# Patient Record
Sex: MALE | Race: WHITE | NOT HISPANIC OR LATINO | Employment: FULL TIME | URBAN - METROPOLITAN AREA
[De-identification: names, ages, dates, MRNs, and addresses within clinical notes are randomized per-mention and may not be internally consistent; named-entity substitution may affect disease eponyms.]

---

## 2023-01-31 ENCOUNTER — LAB VISIT (OUTPATIENT)
Dept: LAB | Facility: HOSPITAL | Age: 53
End: 2023-01-31
Payer: COMMERCIAL

## 2023-01-31 ENCOUNTER — OFFICE VISIT (OUTPATIENT)
Dept: PRIMARY CARE CLINIC | Facility: CLINIC | Age: 53
End: 2023-01-31
Payer: COMMERCIAL

## 2023-01-31 VITALS
RESPIRATION RATE: 16 BRPM | OXYGEN SATURATION: 96 % | HEIGHT: 73 IN | TEMPERATURE: 98 F | SYSTOLIC BLOOD PRESSURE: 122 MMHG | DIASTOLIC BLOOD PRESSURE: 76 MMHG | HEART RATE: 68 BPM | WEIGHT: 213.19 LBS | BODY MASS INDEX: 28.25 KG/M2

## 2023-01-31 DIAGNOSIS — Z12.11 COLON CANCER SCREENING: ICD-10-CM

## 2023-01-31 DIAGNOSIS — Z00.00 ANNUAL PHYSICAL EXAM: ICD-10-CM

## 2023-01-31 DIAGNOSIS — Z11.4 ENCOUNTER FOR SCREENING FOR HIV: ICD-10-CM

## 2023-01-31 DIAGNOSIS — R53.83 FATIGUE, UNSPECIFIED TYPE: ICD-10-CM

## 2023-01-31 DIAGNOSIS — Z13.6 ENCOUNTER FOR LIPID SCREENING FOR CARDIOVASCULAR DISEASE: ICD-10-CM

## 2023-01-31 DIAGNOSIS — R09.81 CONGESTION OF NASAL SINUS: ICD-10-CM

## 2023-01-31 DIAGNOSIS — F41.9 ANXIETY: ICD-10-CM

## 2023-01-31 DIAGNOSIS — Z12.5 ENCOUNTER FOR SCREENING FOR MALIGNANT NEOPLASM OF PROSTATE: ICD-10-CM

## 2023-01-31 DIAGNOSIS — Z13.1 ENCOUNTER FOR SCREENING FOR DIABETES MELLITUS: ICD-10-CM

## 2023-01-31 DIAGNOSIS — Z00.00 ANNUAL PHYSICAL EXAM: Primary | ICD-10-CM

## 2023-01-31 DIAGNOSIS — Z13.220 ENCOUNTER FOR LIPID SCREENING FOR CARDIOVASCULAR DISEASE: ICD-10-CM

## 2023-01-31 DIAGNOSIS — Z76.89 ENCOUNTER TO ESTABLISH CARE: ICD-10-CM

## 2023-01-31 DIAGNOSIS — Z11.59 ENCOUNTER FOR HCV SCREENING TEST FOR LOW RISK PATIENT: ICD-10-CM

## 2023-01-31 DIAGNOSIS — Z87.891 PERSONAL HISTORY OF SMOKING: ICD-10-CM

## 2023-01-31 DIAGNOSIS — Z87.19 HISTORY OF DIVERTICULOSIS: ICD-10-CM

## 2023-01-31 DIAGNOSIS — K63.5 POLYP OF COLON, UNSPECIFIED PART OF COLON, UNSPECIFIED TYPE: ICD-10-CM

## 2023-01-31 DIAGNOSIS — J31.0 CHRONIC RHINITIS: ICD-10-CM

## 2023-01-31 LAB
ALBUMIN SERPL BCP-MCNC: 4.4 G/DL (ref 3.5–5.2)
ALP SERPL-CCNC: 80 U/L (ref 55–135)
ALT SERPL W/O P-5'-P-CCNC: 41 U/L (ref 10–44)
ANION GAP SERPL CALC-SCNC: 8 MMOL/L (ref 8–16)
AST SERPL-CCNC: 23 U/L (ref 10–40)
BASOPHILS # BLD AUTO: 0.09 K/UL (ref 0–0.2)
BASOPHILS NFR BLD: 1.5 % (ref 0–1.9)
BILIRUB SERPL-MCNC: 0.5 MG/DL (ref 0.1–1)
BUN SERPL-MCNC: 7 MG/DL (ref 6–20)
CALCIUM SERPL-MCNC: 9.8 MG/DL (ref 8.7–10.5)
CHLORIDE SERPL-SCNC: 106 MMOL/L (ref 95–110)
CHOLEST SERPL-MCNC: 274 MG/DL (ref 120–199)
CHOLEST/HDLC SERPL: 6.9 {RATIO} (ref 2–5)
CO2 SERPL-SCNC: 25 MMOL/L (ref 23–29)
COMPLEXED PSA SERPL-MCNC: 0.87 NG/ML (ref 0–4)
CREAT SERPL-MCNC: 0.9 MG/DL (ref 0.5–1.4)
DIFFERENTIAL METHOD: NORMAL
EOSINOPHIL # BLD AUTO: 0.1 K/UL (ref 0–0.5)
EOSINOPHIL NFR BLD: 1 % (ref 0–8)
ERYTHROCYTE [DISTWIDTH] IN BLOOD BY AUTOMATED COUNT: 12.5 % (ref 11.5–14.5)
EST. GFR  (NO RACE VARIABLE): >60 ML/MIN/1.73 M^2
ESTIMATED AVG GLUCOSE: 117 MG/DL (ref 68–131)
GLUCOSE SERPL-MCNC: 103 MG/DL (ref 70–110)
HBA1C MFR BLD: 5.7 % (ref 4–5.6)
HCT VFR BLD AUTO: 46.7 % (ref 40–54)
HCV AB SERPL QL IA: NORMAL
HDLC SERPL-MCNC: 40 MG/DL (ref 40–75)
HDLC SERPL: 14.6 % (ref 20–50)
HGB BLD-MCNC: 15.2 G/DL (ref 14–18)
HIV 1+2 AB+HIV1 P24 AG SERPL QL IA: NORMAL
IMM GRANULOCYTES # BLD AUTO: 0.02 K/UL (ref 0–0.04)
IMM GRANULOCYTES NFR BLD AUTO: 0.3 % (ref 0–0.5)
LDLC SERPL CALC-MCNC: 164.6 MG/DL (ref 63–159)
LYMPHOCYTES # BLD AUTO: 2.1 K/UL (ref 1–4.8)
LYMPHOCYTES NFR BLD: 33.8 % (ref 18–48)
MCH RBC QN AUTO: 28.3 PG (ref 27–31)
MCHC RBC AUTO-ENTMCNC: 32.5 G/DL (ref 32–36)
MCV RBC AUTO: 87 FL (ref 82–98)
MONOCYTES # BLD AUTO: 0.7 K/UL (ref 0.3–1)
MONOCYTES NFR BLD: 10.9 % (ref 4–15)
NEUTROPHILS # BLD AUTO: 3.2 K/UL (ref 1.8–7.7)
NEUTROPHILS NFR BLD: 52.5 % (ref 38–73)
NONHDLC SERPL-MCNC: 234 MG/DL
NRBC BLD-RTO: 0 /100 WBC
PLATELET # BLD AUTO: 333 K/UL (ref 150–450)
PMV BLD AUTO: 11 FL (ref 9.2–12.9)
POTASSIUM SERPL-SCNC: 4.7 MMOL/L (ref 3.5–5.1)
PROT SERPL-MCNC: 7.9 G/DL (ref 6–8.4)
RBC # BLD AUTO: 5.37 M/UL (ref 4.6–6.2)
SODIUM SERPL-SCNC: 139 MMOL/L (ref 136–145)
TESTOST SERPL-MCNC: 446 NG/DL (ref 304–1227)
TRIGL SERPL-MCNC: 347 MG/DL (ref 30–150)
TSH SERPL DL<=0.005 MIU/L-ACNC: 1.64 UIU/ML (ref 0.4–4)
WBC # BLD AUTO: 6.07 K/UL (ref 3.9–12.7)

## 2023-01-31 PROCEDURE — 1159F PR MEDICATION LIST DOCUMENTED IN MEDICAL RECORD: ICD-10-PCS | Mod: CPTII,S$GLB,, | Performed by: FAMILY MEDICINE

## 2023-01-31 PROCEDURE — 99999 PR PBB SHADOW E&M-NEW PATIENT-LVL V: CPT | Mod: PBBFAC,,, | Performed by: FAMILY MEDICINE

## 2023-01-31 PROCEDURE — 1160F PR REVIEW ALL MEDS BY PRESCRIBER/CLIN PHARMACIST DOCUMENTED: ICD-10-PCS | Mod: CPTII,S$GLB,, | Performed by: FAMILY MEDICINE

## 2023-01-31 PROCEDURE — 36415 COLL VENOUS BLD VENIPUNCTURE: CPT | Mod: PN | Performed by: FAMILY MEDICINE

## 2023-01-31 PROCEDURE — 83036 HEMOGLOBIN GLYCOSYLATED A1C: CPT | Performed by: FAMILY MEDICINE

## 2023-01-31 PROCEDURE — 85025 COMPLETE CBC W/AUTO DIFF WBC: CPT | Performed by: FAMILY MEDICINE

## 2023-01-31 PROCEDURE — 84403 ASSAY OF TOTAL TESTOSTERONE: CPT | Performed by: FAMILY MEDICINE

## 2023-01-31 PROCEDURE — 99386 PREV VISIT NEW AGE 40-64: CPT | Mod: S$GLB,,, | Performed by: FAMILY MEDICINE

## 2023-01-31 PROCEDURE — 3078F DIAST BP <80 MM HG: CPT | Mod: CPTII,S$GLB,, | Performed by: FAMILY MEDICINE

## 2023-01-31 PROCEDURE — 3074F SYST BP LT 130 MM HG: CPT | Mod: CPTII,S$GLB,, | Performed by: FAMILY MEDICINE

## 2023-01-31 PROCEDURE — 80061 LIPID PANEL: CPT | Performed by: FAMILY MEDICINE

## 2023-01-31 PROCEDURE — 1160F RVW MEDS BY RX/DR IN RCRD: CPT | Mod: CPTII,S$GLB,, | Performed by: FAMILY MEDICINE

## 2023-01-31 PROCEDURE — 1159F MED LIST DOCD IN RCRD: CPT | Mod: CPTII,S$GLB,, | Performed by: FAMILY MEDICINE

## 2023-01-31 PROCEDURE — 84153 ASSAY OF PSA TOTAL: CPT | Performed by: FAMILY MEDICINE

## 2023-01-31 PROCEDURE — 80053 COMPREHEN METABOLIC PANEL: CPT | Performed by: FAMILY MEDICINE

## 2023-01-31 PROCEDURE — 3074F PR MOST RECENT SYSTOLIC BLOOD PRESSURE < 130 MM HG: ICD-10-PCS | Mod: CPTII,S$GLB,, | Performed by: FAMILY MEDICINE

## 2023-01-31 PROCEDURE — 3008F BODY MASS INDEX DOCD: CPT | Mod: CPTII,S$GLB,, | Performed by: FAMILY MEDICINE

## 2023-01-31 PROCEDURE — 87389 HIV-1 AG W/HIV-1&-2 AB AG IA: CPT | Performed by: FAMILY MEDICINE

## 2023-01-31 PROCEDURE — 84443 ASSAY THYROID STIM HORMONE: CPT | Performed by: FAMILY MEDICINE

## 2023-01-31 PROCEDURE — 3078F PR MOST RECENT DIASTOLIC BLOOD PRESSURE < 80 MM HG: ICD-10-PCS | Mod: CPTII,S$GLB,, | Performed by: FAMILY MEDICINE

## 2023-01-31 PROCEDURE — 3008F PR BODY MASS INDEX (BMI) DOCUMENTED: ICD-10-PCS | Mod: CPTII,S$GLB,, | Performed by: FAMILY MEDICINE

## 2023-01-31 PROCEDURE — 99386 PR PREVENTIVE VISIT,NEW,40-64: ICD-10-PCS | Mod: S$GLB,,, | Performed by: FAMILY MEDICINE

## 2023-01-31 PROCEDURE — 99999 PR PBB SHADOW E&M-NEW PATIENT-LVL V: ICD-10-PCS | Mod: PBBFAC,,, | Performed by: FAMILY MEDICINE

## 2023-01-31 PROCEDURE — 86803 HEPATITIS C AB TEST: CPT | Performed by: FAMILY MEDICINE

## 2023-01-31 RX ORDER — SERTRALINE HYDROCHLORIDE 50 MG/1
50 TABLET, FILM COATED ORAL
COMMUNITY
Start: 2022-12-27 | End: 2023-01-31 | Stop reason: ALTCHOICE

## 2023-01-31 RX ORDER — DEXTROMETHORPHAN HBR AND PYRILAMINE MALEATE 30; 30 MG/1; MG/1
1 TABLET ORAL EVERY 6 HOURS PRN
Qty: 30 TABLET | Refills: 0 | Status: SHIPPED | OUTPATIENT
Start: 2023-01-31 | End: 2023-03-23 | Stop reason: SDUPTHER

## 2023-01-31 RX ORDER — VENLAFAXINE HYDROCHLORIDE 75 MG/1
75 CAPSULE, EXTENDED RELEASE ORAL DAILY
Qty: 90 CAPSULE | Refills: 3 | Status: SHIPPED | OUTPATIENT
Start: 2023-01-31 | End: 2024-02-12 | Stop reason: SDUPTHER

## 2023-01-31 RX ORDER — LORATADINE 10 MG/1
10 TABLET ORAL
COMMUNITY
Start: 2022-12-27 | End: 2023-01-31 | Stop reason: ALTCHOICE

## 2023-01-31 NOTE — PROGRESS NOTES
[facility]       [address] [place] [address]      Phone: [phone]      Fax: [phone]    Fletcher Mccray MD    Office Visit  01/31/2023    Establish Care      52-year-old gentleman presents today to [facility] care, checkup.      Patient states that he feels somewhat okay today.  Has been having some sinus congestion for the last couple of days.  No fever, chills, body aches.  Some occasional coughing, sneezing.  Runny nose, postnasal drip.    Otherwise, he denies any chest pain, shortness a breath.  States bowel movements are okay.  He occasionally gets constipated.  Takes a laxative, as needed.  Often feels bloated, carries his stool high.  No urinary symptoms.  No hesitation with start of urination.  Good stream.    States that he is had multiple colonoscopies over the years.  Typically gets them every 3-5 years.  Has multiple polyps every time.  Thinks his last scope was about three or four years ago.    Also has issues with anxiety.  In the past, took Lexapro.  About two years ago, switch to Zoloft.  Feels like he needs his dose increased a bit.  Biggest complaint with the Zoloft is the lack of energy, constant fatigue.  Always feels tired.    PMH: Anxiety/anger issues.  Colon polyps.  Diverticulitis.    PSH: Gallbladder.  Hand surgery.    F MH: Multiple cancers.  Lymphoma.  Breast cancer.  Cervical cancer.  Skin cancer.    Allergies:  Prednisone causes extreme hiccups.    Social:  Works as a project supervisor in construction.  Currently working on the [email or web address].    T:  Approximately 1/4 pack per day times 20+ years   A:  Occasionally  D: Denies    Exercise:  No regular exercise program      Immunizations:    There is no immunization history on file for this patient.    Care Teams:  Patient Care Team:  Fletcher Mccray MD as PCP - General (Family Medicine)    Medical History:  Past Medical History:   Diagnosis Date    Anxiety         Social History:  Social History     Socioeconomic History    Marital status:    Tobacco Use    Smoking status: Every Day     Packs/day: 0.25     Years: 25.00     Pack years: 6.25     Types: Cigarettes     Start date: 1/1/1998    Smokeless tobacco: Never   Substance and Sexual Activity    Alcohol use: Yes     Alcohol/week: 1.0 standard drink     Types: 1 Shots of liquor per week     Comment: on occasion    Drug use: Never    Sexual activity: Yes     Partners: Female     Birth control/protection: See Surgical Hx     Comment: Spouse had Hysterectomy       Alcohol History:  Social History     Substance and Sexual Activity   Alcohol Use Yes    Alcohol/week: 1.0 standard drink    Types: 1 Shots of liquor per week    Comment: on occasion       Tobacco History:  Social History     Tobacco Use   Smoking Status Every Day    Packs/day: 0.25    Years: 25.00    Pack years: 6.25    Types: Cigarettes    Start date: 1/1/1998   Smokeless Tobacco Never       Family History:  Family History   Problem Relation Age of Onset    Arthritis Mother     Cancer Mother     Hypertension Mother     Arthritis Father     Heart disease Father     Heart disease Paternal Grandmother     Cancer Maternal Uncle     Cancer Maternal Aunt     Cancer Maternal Aunt        Surgical History:  Past Surgical History:   Procedure Laterality Date    CHOLECYSTECTOMY  2014       Allergies:  Review of patient's allergies indicates:   Allergen Reactions    Prednisone Other (See Comments)     Causes severe hiccups per pt       Medications:    Current Outpatient Medications:     pyrilamine-dextromethorphan (CAPRON DMT) 30-30 mg Tab, Take 1 tablet by mouth every 6 (six) hours as needed (congestion, cough)., Disp: 30 tablet, Rfl: 0    venlafaxine (EFFEXOR-XR) 75 MG 24 hr capsule, Take 1 capsule (75 mg total) by mouth once daily., Disp: 90 capsule, Rfl: 3    Health Maintenance:  Health Maintenance   Topic Date Due    Hepatitis C Screening  Never done    Lipid  "Panel  Never done    TETANUS VACCINE  Never done       Screening Questions  1.  Do you smoke? Yes  2.  In the past month have you been bothered by feeling "down", depressed or hopeless? No  3.  In the past month, have you experienced a loss of interest or pleasure in doing things? No  4.  In the past 3 months, on any single occasion have you had 5 or more drinks containing alcohol? No  5.  Regarding your use of alcohol, have you ever felt you should cut down on your drinking? No  6.  Are you sexually active? Yes  7   Do you exercise?  intermittently    Counseling  The patient was counseled regarding diet and exercise, motor vehicle safety, sun exposure, and use of vitamins and supplements.  The patient was counseled regarding Living Will/Durable Power-Of-.  The patient was given information regarding the dangers of smoking and the overuse of alcohol.    Review of Systems   Constitutional:  Positive for fatigue. Negative for activity change, appetite change, chills and fever.   HENT:  Positive for congestion, postnasal drip and rhinorrhea. Negative for sore throat and trouble swallowing.    Respiratory:  Positive for cough. Negative for shortness of breath.    Cardiovascular:  Negative for chest pain and palpitations.   Gastrointestinal:  Positive for constipation. Negative for abdominal pain, diarrhea, nausea and vomiting.   Genitourinary:  Negative for difficulty urinating.   Musculoskeletal:  Negative for arthralgias and myalgias.   Skin:  Negative for color change and rash.   Neurological:  Negative for headaches.   Psychiatric/Behavioral:  Positive for agitation. The patient is nervous/anxious.    All other systems reviewed and are negative.     Objective   Vitals:    01/31/23 0812   BP: 122/76   Pulse: 68   Resp: 16   Temp: 97.7 °F (36.5 °C)   TempSrc: Temporal   SpO2: 96%   Weight: 96.7 kg (213 lb 3 oz)   Height: 6' 1" (1.854 m)     Physical Exam  Vitals and nursing note reviewed.   Constitutional:     "   General: He is not in acute distress.     Appearance: Normal appearance.   HENT:      Head: Normocephalic and atraumatic.      Right Ear: Tympanic membrane, ear canal and external ear normal.      Left Ear: Tympanic membrane, ear canal and external ear normal.      Nose: Congestion present. No rhinorrhea.      Mouth/Throat:      Mouth: Mucous membranes are moist.      Pharynx: Oropharynx is clear. No oropharyngeal exudate or posterior oropharyngeal erythema.   Eyes:      Extraocular Movements: Extraocular movements intact.      Conjunctiva/sclera: Conjunctivae normal.      Pupils: Pupils are equal, round, and reactive to light.   Cardiovascular:      Rate and Rhythm: Normal rate and regular rhythm.   Pulmonary:      Effort: Pulmonary effort is normal.      Breath sounds: No wheezing, rhonchi or rales.   Musculoskeletal:         General: Normal range of motion.      Cervical back: Normal range of motion.   Lymphadenopathy:      Cervical: No cervical adenopathy.   Skin:     General: Skin is warm and dry.   Neurological:      General: No focal deficit present.      Mental Status: He is alert.        No results found for this or any previous visit (from the past 4368 hour(s)).    Plan    Eduardo was seen today for establish care.    Diagnoses and all orders for this visit:    Annual physical exam  -     CBC Auto Differential; Future  -     Comprehensive Metabolic Panel; Future  -     TSH; Future  -     Lipid Panel; Future  -     PSA, Screening; Future  -     Hemoglobin A1C; Future  -     HIV 1/2 Ag/Ab (4th Gen); Future  -     Hepatitis C Antibody; Future  -     Testosterone; Future    Encounter to establish care  -     CBC Auto Differential; Future  -     Comprehensive Metabolic Panel; Future  -     TSH; Future  -     Lipid Panel; Future  -     PSA, Screening; Future  -     Hemoglobin A1C; Future  -     HIV 1/2 Ag/Ab (4th Gen); Future  -     Hepatitis C Antibody; Future  -     Testosterone; Future    Fatigue,  unspecified type  -     CBC Auto Differential; Future  -     Comprehensive Metabolic Panel; Future  -     TSH; Future  -     Testosterone; Future    Anxiety  -     CBC Auto Differential; Future  -     Comprehensive Metabolic Panel; Future  -     TSH; Future  -     venlafaxine (EFFEXOR-XR) 75 MG 24 hr capsule; Take 1 capsule (75 mg total) by mouth once daily.    Chronic rhinitis    Congestion of nasal sinus  -     pyrilamine-dextromethorphan (CAPRON DMT) 30-30 mg Tab; Take 1 tablet by mouth every 6 (six) hours as needed (congestion, cough).    Polyp of colon, unspecified part of colon, unspecified type  -     Ambulatory referral/consult to Endo Procedure ; Future    History of diverticulosis  -     Ambulatory referral/consult to Endo Procedure ; Future    Colon cancer screening  -     Ambulatory referral/consult to Endo Procedure ; Future    Encounter for screening for malignant neoplasm of prostate  -     PSA, Screening; Future    Encounter for lipid screening for cardiovascular disease  -     Lipid Panel; Future    Encounter for screening for diabetes mellitus  -     Hemoglobin A1C; Future    Encounter for HCV screening test for low risk patient  -     Hepatitis C Antibody; Future    Encounter for screening for HIV  -     HIV 1/2 Ag/Ab (4th Gen); Future    Personal history of smoking    Physically, looks good today.      Will get screening labs, as above.      For his anxiety issues, will try swap Zoloft for Effexor to see if that gives him some extra energy.    Likely due for screening colonoscopy.  With his history of polyps, diverticulitis, recommended we go ahead and try to schedule this.  Order placed today.    For his sinuses, will have him do OTC AH, [place] tabs.    Follow-up:  Follow up in about 6 months (around 7/31/2023) for check up, and in 1 year for annual exam.    Signed by:  Fletcher Mccray MD

## 2023-01-31 NOTE — PATIENT INSTRUCTIONS
Physically, everything looks good today.      Blood work done today.  Results will be posted to [facility] as soon as they are available.      With your history of colon polyps, let us go ahead and schedule a colonoscopy to make sure everything looks okay on the inside.  Referral placed today.  The endoscopy  should contact you to pick a date, time, and to go over prep instructions.      Continue to eat a healthy diet.  Be careful with portion sizes.  Includes lots of fresh fruits, vegetables, whole grains, lean proteins.  See info below.    Keep hydrated.  Be sure to drink at least 8-10, 8 oz, glasses of water every day.    Stay active.  Try to do some sort of physical activity every day.  Nothing outrageous, just try walking for 10-15 minutes each day.     For your allergies, congestion, etc., try rotating through different OTC antihistamines.  When you run out of your Claritin, try using Zyrtec instead.  When that runs out, switch to Allegra, when that runs out switch to Xyzal, etc..    Try using my [place]-DMT tablets, as needed for congestion, cough, runny nose.  Only use these sparingly.    For anxiety/anger issues, let us try switching from Zoloft to Effexor.  New prescription sent to pharmacy today.  Alternate these every other day for about one week, to wean you off of the Zoloft and to taper off on the Effexor.  After about one week, you should be able to just take the Effexor daily.  Be aware, it can take several weeks before you really noticed a significant difference.

## 2023-02-03 DIAGNOSIS — E78.5 HYPERLIPIDEMIA, UNSPECIFIED HYPERLIPIDEMIA TYPE: Primary | ICD-10-CM

## 2023-02-03 RX ORDER — ROSUVASTATIN CALCIUM 10 MG/1
10 TABLET, COATED ORAL DAILY
Qty: 90 TABLET | Refills: 3 | Status: SHIPPED | OUTPATIENT
Start: 2023-02-03 | End: 2023-04-03 | Stop reason: ALTCHOICE

## 2023-02-03 NOTE — PROGRESS NOTES
Mr. Grant,     You should be able to see the results of your recent blood work in [facility].  Overall, most things look fine.      Blood counts were all normal.  Electrolytes, kidney function, liver function, and thyroid function were also normal.  Testosterone levels were normal.  PSA levels were in the normal range.  You are negative for HIV and hepatitis-C.    Your blood sugar was okay, but your A1c level was just a tiny bit elevated.  It does place you in the prediabetic range, but I would not worry too much about that.  Just focus on diet and exercise and you should be fine.  We will keep an eye on it.  If it continues to rise, at some point we may need to talk about medication to help with sugar, but my first-line treatment is diet and exercise.    What I worry about is your cholesterol.  Your total cholesterol was 274.  This should be under 200.  Triglycerides were really high at 347.  These should be under 150.  HDL was okay at 40, anything 40 and above is normal.  LDL, or bad cholesterol, was also elevated at 164.  This should be under 100.     I worry about this because with your age, gender, blood pressure, smoking, etc., you are at a fairly high risk for developing a heart attack or other cardiac event within the next 10 years.    As I mentioned with your sugar, diet and exercise are my first-line treatments.  Be careful with portion sizes.  Try to avoid fast foods, packaged foods, processed foods.  Include lots of fresh fruits, vegetables, whole grains, lean proteins in your diet.  Drink lots of water every day.  Stay active, do some sort of physical.  Even walking 5-10 minutes every day can help.      At these levels, I really think we should start on a low-dose of cholesterol medicine to see if that can help bring the numbers down.  I am going to send a prescription for some low-dose Crestor to the pharmacy.  Would really like you to start taking this once daily.  If you do not want to start a medicine  yet, but try giving diet and exercise a chance, I am okay with that as well.  Either way, let us recheck your numbers in six months.  I will place that lab order today.  Please try to stop by the clinic a couple of days before our next appointment to get the blood drawn.  Ideally, be fasting for it.      Please let me know if you have any questions!

## 2023-02-06 ENCOUNTER — HOSPITAL ENCOUNTER (OUTPATIENT)
Dept: PREADMISSION TESTING | Facility: HOSPITAL | Age: 53
Discharge: HOME | End: 2023-02-06
Attending: FAMILY MEDICINE
Payer: COMMERCIAL

## 2023-02-06 DIAGNOSIS — Z12.11 COLON CANCER SCREENING: ICD-10-CM

## 2023-02-06 DIAGNOSIS — Z87.19 HISTORY OF DIVERTICULOSIS: ICD-10-CM

## 2023-02-06 DIAGNOSIS — K63.5 POLYP OF COLON, UNSPECIFIED PART OF COLON, UNSPECIFIED TYPE: Primary | ICD-10-CM

## 2023-02-07 RX ORDER — SODIUM, POTASSIUM,MAG SULFATES 17.5-3.13G
1 SOLUTION, RECONSTITUTED, ORAL ORAL DAILY
Qty: 1 KIT | Refills: 0 | Status: SHIPPED | OUTPATIENT
Start: 2023-02-07 | End: 2023-02-09

## 2023-03-23 ENCOUNTER — PATIENT MESSAGE (OUTPATIENT)
Dept: PRIMARY CARE CLINIC | Facility: CLINIC | Age: 53
End: 2023-03-23
Payer: COMMERCIAL

## 2023-03-23 DIAGNOSIS — R09.81 CONGESTION OF NASAL SINUS: ICD-10-CM

## 2023-03-24 RX ORDER — DEXTROMETHORPHAN HBR AND PYRILAMINE MALEATE 30; 30 MG/1; MG/1
1 TABLET ORAL EVERY 6 HOURS PRN
Qty: 30 TABLET | Refills: 0 | Status: SHIPPED | OUTPATIENT
Start: 2023-03-24 | End: 2023-04-03 | Stop reason: ALTCHOICE

## 2023-04-03 ENCOUNTER — OFFICE VISIT (OUTPATIENT)
Dept: PRIMARY CARE CLINIC | Facility: CLINIC | Age: 53
End: 2023-04-03
Payer: COMMERCIAL

## 2023-04-03 VITALS
DIASTOLIC BLOOD PRESSURE: 88 MMHG | HEIGHT: 73 IN | HEART RATE: 72 BPM | SYSTOLIC BLOOD PRESSURE: 110 MMHG | WEIGHT: 210.13 LBS | TEMPERATURE: 99 F | BODY MASS INDEX: 27.85 KG/M2

## 2023-04-03 DIAGNOSIS — J06.9 URI WITH COUGH AND CONGESTION: ICD-10-CM

## 2023-04-03 DIAGNOSIS — J01.00 SUBACUTE MAXILLARY SINUSITIS: Primary | ICD-10-CM

## 2023-04-03 PROCEDURE — 99999 PR PBB SHADOW E&M-EST. PATIENT-LVL IV: CPT | Mod: PBBFAC,,, | Performed by: FAMILY MEDICINE

## 2023-04-03 PROCEDURE — 3044F PR MOST RECENT HEMOGLOBIN A1C LEVEL <7.0%: ICD-10-PCS | Mod: CPTII,S$GLB,, | Performed by: FAMILY MEDICINE

## 2023-04-03 PROCEDURE — 99214 OFFICE O/P EST MOD 30 MIN: CPT | Mod: S$GLB,,, | Performed by: FAMILY MEDICINE

## 2023-04-03 PROCEDURE — 3079F PR MOST RECENT DIASTOLIC BLOOD PRESSURE 80-89 MM HG: ICD-10-PCS | Mod: CPTII,S$GLB,, | Performed by: FAMILY MEDICINE

## 2023-04-03 PROCEDURE — 3074F SYST BP LT 130 MM HG: CPT | Mod: CPTII,S$GLB,, | Performed by: FAMILY MEDICINE

## 2023-04-03 PROCEDURE — 3074F PR MOST RECENT SYSTOLIC BLOOD PRESSURE < 130 MM HG: ICD-10-PCS | Mod: CPTII,S$GLB,, | Performed by: FAMILY MEDICINE

## 2023-04-03 PROCEDURE — 3008F PR BODY MASS INDEX (BMI) DOCUMENTED: ICD-10-PCS | Mod: CPTII,S$GLB,, | Performed by: FAMILY MEDICINE

## 2023-04-03 PROCEDURE — 99214 PR OFFICE/OUTPT VISIT, EST, LEVL IV, 30-39 MIN: ICD-10-PCS | Mod: S$GLB,,, | Performed by: FAMILY MEDICINE

## 2023-04-03 PROCEDURE — 3008F BODY MASS INDEX DOCD: CPT | Mod: CPTII,S$GLB,, | Performed by: FAMILY MEDICINE

## 2023-04-03 PROCEDURE — 3044F HG A1C LEVEL LT 7.0%: CPT | Mod: CPTII,S$GLB,, | Performed by: FAMILY MEDICINE

## 2023-04-03 PROCEDURE — 99999 PR PBB SHADOW E&M-EST. PATIENT-LVL IV: ICD-10-PCS | Mod: PBBFAC,,, | Performed by: FAMILY MEDICINE

## 2023-04-03 PROCEDURE — 1160F RVW MEDS BY RX/DR IN RCRD: CPT | Mod: CPTII,S$GLB,, | Performed by: FAMILY MEDICINE

## 2023-04-03 PROCEDURE — 3079F DIAST BP 80-89 MM HG: CPT | Mod: CPTII,S$GLB,, | Performed by: FAMILY MEDICINE

## 2023-04-03 PROCEDURE — 1159F MED LIST DOCD IN RCRD: CPT | Mod: CPTII,S$GLB,, | Performed by: FAMILY MEDICINE

## 2023-04-03 PROCEDURE — 1159F PR MEDICATION LIST DOCUMENTED IN MEDICAL RECORD: ICD-10-PCS | Mod: CPTII,S$GLB,, | Performed by: FAMILY MEDICINE

## 2023-04-03 PROCEDURE — 1160F PR REVIEW ALL MEDS BY PRESCRIBER/CLIN PHARMACIST DOCUMENTED: ICD-10-PCS | Mod: CPTII,S$GLB,, | Performed by: FAMILY MEDICINE

## 2023-04-03 RX ORDER — DEXAMETHASONE 4 MG/1
8 TABLET ORAL DAILY
Qty: 6 TABLET | Refills: 0 | Status: SHIPPED | OUTPATIENT
Start: 2023-04-03 | End: 2023-04-06

## 2023-04-03 RX ORDER — AMOXICILLIN AND CLAVULANATE POTASSIUM 875; 125 MG/1; MG/1
1 TABLET, FILM COATED ORAL EVERY 12 HOURS
Qty: 14 TABLET | Refills: 0 | Status: SHIPPED | OUTPATIENT
Start: 2023-04-03 | End: 2023-04-10

## 2023-04-03 RX ORDER — PROMETHAZINE HYDROCHLORIDE AND DEXTROMETHORPHAN HYDROBROMIDE 6.25; 15 MG/5ML; MG/5ML
5 SYRUP ORAL EVERY 6 HOURS PRN
Qty: 120 ML | Refills: 0 | Status: SHIPPED | OUTPATIENT
Start: 2023-04-03 | End: 2023-04-13

## 2023-04-03 NOTE — PATIENT INSTRUCTIONS
"Suspect we have a sinus infection brewing in there.      Take all of my antibiotics, as directed.  This will knock out any bacterial process and help you feel better.      I know prednisone causes you hiccups, so let us try dexamethasone instead.  If at any point the hiccups return, please let me know asap and I can send some medicine to help stop them.    Start with just 1/2 tablet of dexamethasone.  Wait 2 hours.  If doing okay, go ahead and take the 2nd 1/2 tablet.  If still no issues after two more hours, you can take a 2nd whole tablet.    If you tolerate these okay, take two tablets each morning for the next couple of days.  This will open up the sinuses, cut down on some of the mucus production, help you feel a bit better.    Rest  Stay hydrated, drink plenty of fluids   Use OTC nasal saline spray (Ocenn's, AYR, [employer],etc.) to clear nasal drainage and help with nasal congestion  Use an OTC antihistamine (Zyrtec or Claritin) to help dry mucus and post nasal drip  Use OTC Mucinex (plain blue box, no "letters") for sinus/chest congestion  Gargle with warm salt water for throat comfort (10-15 seconds, do NOT swallow!)  Use OTC zinc lozenges or OTC Cepacol lozenges (with benzocaine) for sore throat/cough  Alternate OTC Tylenol and/or Ibuprofen for fever, headache and body aches   "

## 2023-04-03 NOTE — PROGRESS NOTES
"    [facility]       [address] [place] [address]      Phone: [phone]      Fax: [phone]    Fletcher Mccray MD                Office Visit  04/03/2023        Subjective      HPI:  Eduardo Sanders is a 52 y.o. male presents today in clinic for "Sinus Problem ([id number])  ."     52-year-old gentleman presents today complaining of sinuses.      Patient states that his sinuses have been acting up for the last three weeks.  Lots of congestion.  Runny nose, thick, clear.  Coughing yellow, thick sputum.  One side of nose.  Up, then the other.  Lots of pressure.  Teeth hurt.  Has been taking some OTC medications with no significant relief.  No fever, but felt warm.  Had some chills, body aches.    PMH: Anxiety/anger issues.  Colon polyps.  Diverticulitis.    PSH: Gallbladder.  Hand surgery.    F MH: Multiple cancers.  Lymphoma.  Breast cancer.  Cervical cancer.  Skin cancer.    Allergies:  Prednisone causes extreme hiccups.    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  Currently working on [employer].    T:  Approximately 1/4 pack per day times 20+ years   A:  Occasionally  D: Denies    Exercise:  No regular exercise program    Cough  This is a recurrent problem. The current episode started in the past 7 days. The problem has been waxing and waning. The problem occurs every few hours. The cough is Non-productive. Associated symptoms include headaches, myalgias, nasal congestion, postnasal drip, rhinorrhea, shortness of breath and sweats. Pertinent negatives include no chest pain, chills, ear congestion, ear pain, fever, heartburn, hemoptysis, rash, sore throat, weight loss or wheezing. The symptoms are aggravated by dust, fumes and pollens. Risk factors for lung disease include animal exposure, occupational exposure and smoking/tobacco exposure. He has tried OTC cough suppressant, a beta-agonist inhaler, body position changes and " cool air for the symptoms. The treatment provided mild relief. His past medical history is significant for asthma and environmental allergies. There is no history of bronchiectasis, bronchitis, COPD, emphysema or pneumonia.   Sinus Problem  Associated symptoms include coughing, headaches, shortness of breath and sinus pressure. Pertinent negatives include no chills, congestion, ear pain or sore throat.     The following were updated and reviewed by myself in the chart: medications, past medical history, past surgical history, family history, social history, and allergies.     Medications:  Current Outpatient Medications on File Prior to Visit   Medication Sig Dispense Refill    venlafaxine (EFFEXOR-XR) 75 MG 24 hr capsule Take 1 capsule (75 mg total) by mouth once daily. 90 capsule 3    [DISCONTINUED] pyrilamine-dextromethorphan (CAPRON DMT) 30-30 mg Tab Take 1 tablet by mouth every 6 (six) hours as needed (congestion, cough). (Patient not taking: Reported on 4/3/2023) 30 tablet 0    [DISCONTINUED] rosuvastatin (CRESTOR) 10 MG tablet Take 1 tablet (10 mg total) by mouth once daily. (Patient not taking: Reported on 4/3/2023) 90 tablet 3     No current facility-administered medications on file prior to visit.        PMHx:  Past Medical History:   Diagnosis Date    Anxiety       There are no problems to display for this patient.       PSHx:  Past Surgical History:   Procedure Laterality Date    CHOLECYSTECTOMY  2014        FHx:  Family History   Problem Relation Age of Onset    Arthritis Mother     Cancer Mother     Hypertension Mother     Arthritis Father     Heart disease Father     Heart disease Paternal Grandmother     Cancer Maternal Uncle     Cancer Maternal Aunt     Cancer Maternal Aunt         Social:  Social History     Socioeconomic History    Marital status:    Tobacco Use    Smoking status: Every Day     Packs/day: 0.25     Years: 25.00     Pack years: 6.25     Types: Cigarettes     Start date:  "1/1/1998    Smokeless tobacco: Never   Substance and Sexual Activity    Alcohol use: Yes     Alcohol/week: 1.0 standard drink     Types: 1 Shots of liquor per week     Comment: on occasion    Drug use: Never    Sexual activity: Yes     Partners: Female     Birth control/protection: See Surgical Hx     Comment: Spouse had Hysterectomy        Allergies:  Review of patient's allergies indicates:   Allergen Reactions    Prednisone Other (See Comments)     Causes severe hiccups per pt        ROS:  Review of Systems   Constitutional:  Negative for activity change, appetite change, chills, fever and weight loss.   HENT:  Positive for postnasal drip, rhinorrhea, sinus pressure and sinus pain. Negative for congestion, ear pain, sore throat and trouble swallowing.    Respiratory:  Positive for cough and shortness of breath. Negative for hemoptysis and wheezing.    Cardiovascular:  Negative for chest pain and palpitations.   Gastrointestinal:  Negative for abdominal pain, constipation, diarrhea, heartburn, nausea and vomiting.   Genitourinary:  Negative for difficulty urinating.   Musculoskeletal:  Positive for myalgias. Negative for arthralgias.   Skin:  Negative for color change and rash.   Allergic/Immunologic: Positive for environmental allergies.   Neurological:  Positive for headaches.   All other systems reviewed and are negative.       Objective      /88   Pulse 72   Temp 99.1 °F (37.3 °C)   Ht 6' 1" (1.854 m)   Wt 95.3 kg (210 lb 1.6 oz)   BMI 27.72 kg/m²   Ht Readings from Last 3 Encounters:   04/03/23 6' 1" (1.854 m)   01/31/23 6' 1" (1.854 m)     Wt Readings from Last 3 Encounters:   04/03/23 95.3 kg (210 lb 1.6 oz)   01/31/23 96.7 kg (213 lb 3 oz)       PHYSICAL EXAM:  Physical Exam  Vitals and nursing note reviewed.   Constitutional:       General: He is not in acute distress.     Appearance: Normal appearance.   HENT:      Head: Normocephalic and atraumatic.      Right Ear: Tympanic membrane, ear " canal and external ear normal.      Left Ear: Tympanic membrane, ear canal and external ear normal.      Nose: Congestion and rhinorrhea present.      Right Sinus: No maxillary sinus tenderness or frontal sinus tenderness.      Left Sinus: No maxillary sinus tenderness or frontal sinus tenderness.      Mouth/Throat:      Mouth: Mucous membranes are moist.      Pharynx: Oropharynx is clear. No oropharyngeal exudate or posterior oropharyngeal erythema.   Eyes:      Extraocular Movements: Extraocular movements intact.      Conjunctiva/sclera: Conjunctivae normal.      Pupils: Pupils are equal, round, and reactive to light.   Cardiovascular:      Rate and Rhythm: Normal rate and regular rhythm.   Pulmonary:      Effort: Pulmonary effort is normal.      Breath sounds: No wheezing, rhonchi or rales.   Musculoskeletal:         General: Normal range of motion.      Cervical back: Normal range of motion.   Lymphadenopathy:      Cervical: No cervical adenopathy.   Skin:     General: Skin is warm and dry.   Neurological:      General: No focal deficit present.      Mental Status: He is alert.            LABS / IMAGING:  Recent Results (from the past 4368 hour(s))   CBC Auto Differential    Collection Time: 01/31/23  9:34 AM   Result Value Ref Range    WBC 6.07 3.90 - 12.70 K/uL    RBC 5.37 4.60 - 6.20 M/uL    Hemoglobin 15.2 14.0 - 18.0 g/dL    Hematocrit 46.7 40.0 - 54.0 %    MCV 87 82 - 98 fL    MCH 28.3 27.0 - 31.0 pg    MCHC 32.5 32.0 - 36.0 g/dL    RDW 12.5 11.5 - 14.5 %    Platelets 333 150 - 450 K/uL    MPV 11.0 9.2 - 12.9 fL    Immature Granulocytes 0.3 0.0 - 0.5 %    Gran # (ANC) 3.2 1.8 - 7.7 K/uL    Immature Grans (Abs) 0.02 0.00 - 0.04 K/uL    Lymph # 2.1 1.0 - 4.8 K/uL    Mono # 0.7 0.3 - 1.0 K/uL    Eos # 0.1 0.0 - 0.5 K/uL    Baso # 0.09 0.00 - 0.20 K/uL    nRBC 0 0 /100 WBC    Gran % 52.5 38.0 - 73.0 %    Lymph % 33.8 18.0 - 48.0 %    Mono % 10.9 4.0 - 15.0 %    Eosinophil % 1.0 0.0 - 8.0 %    Basophil % 1.5  0.0 - 1.9 %    Differential Method Automated    Comprehensive Metabolic Panel    Collection Time: 01/31/23  9:34 AM   Result Value Ref Range    Sodium 139 136 - 145 mmol/L    Potassium 4.7 3.5 - 5.1 mmol/L    Chloride 106 95 - 110 mmol/L    CO2 25 23 - 29 mmol/L    Glucose 103 70 - 110 mg/dL    BUN 7 6 - 20 mg/dL    Creatinine 0.9 0.5 - 1.4 mg/dL    Calcium 9.8 8.7 - 10.5 mg/dL    Total Protein 7.9 6.0 - 8.4 g/dL    Albumin 4.4 3.5 - 5.2 g/dL    Total Bilirubin 0.5 0.1 - 1.0 mg/dL    Alkaline Phosphatase 80 55 - 135 U/L    AST 23 10 - 40 U/L    ALT 41 10 - 44 U/L    Anion Gap 8 8 - 16 mmol/L    eGFR >60.0 >60 mL/min/1.73 m^2   TSH    Collection Time: 01/31/23  9:34 AM   Result Value Ref Range    TSH 1.641 0.400 - 4.000 uIU/mL   Lipid Panel    Collection Time: 01/31/23  9:34 AM   Result Value Ref Range    Cholesterol 274 (H) 120 - 199 mg/dL    Triglycerides 347 (H) 30 - 150 mg/dL    HDL 40 40 - 75 mg/dL    LDL Cholesterol 164.6 (H) 63.0 - 159.0 mg/dL    HDL/Cholesterol Ratio 14.6 (L) 20.0 - 50.0 %    Total Cholesterol/HDL Ratio 6.9 (H) 2.0 - 5.0    Non-HDL Cholesterol 234 mg/dL   PSA, Screening    Collection Time: 01/31/23  9:34 AM   Result Value Ref Range    PSA, Screen 0.87 0.00 - 4.00 ng/mL   Hemoglobin A1C    Collection Time: 01/31/23  9:34 AM   Result Value Ref Range    Hemoglobin A1C 5.7 (H) 4.0 - 5.6 %    Estimated Avg Glucose 117 68 - 131 mg/dL   HIV 1/2 Ag/Ab (4th Gen)    Collection Time: 01/31/23  9:34 AM   Result Value Ref Range    HIV 1/2 Ag/Ab Non-reactive Non-reactive   Hepatitis C Antibody    Collection Time: 01/31/23  9:34 AM   Result Value Ref Range    Hepatitis C Ab Non-reactive Non-reactive   Testosterone    Collection Time: 01/31/23  9:34 AM   Result Value Ref Range    Testosterone, Total 446 304 - 1227 ng/dL         Assessment    1. Subacute maxillary sinusitis    2. URI with cough and congestion          Plan    Eduardo was seen today for sinus problem.    Diagnoses and all orders for this  visit:    Subacute maxillary sinusitis  -     amoxicillin-clavulanate 875-125mg (AUGMENTIN) 875-125 mg per tablet; Take 1 tablet by mouth every 12 (twelve) hours. for 7 days  -     dexAMETHasone (DECADRON) 4 MG Tab; Take 2 tablets (8 mg total) by mouth once daily. for 3 days    URI with cough and congestion  -     dexAMETHasone (DECADRON) 4 MG Tab; Take 2 tablets (8 mg total) by mouth once daily. for 3 days  -     promethazine-dextromethorphan (PROMETHAZINE-DM) 6.25-15 mg/5 mL Syrp; Take 5 mLs by mouth every 6 (six) hours as needed (cough).      Looks and feels uncomfortable.  Since symptoms having on for three weeks, will treat as subacute sinusitis with Augmentin.    Prednisone has caused him hiccups in the past.  Will try dexamethasone instead.    FOLLOW-UP:  Follow up if symptoms worsen or fail to improve.    I spent a total of 35 minutes face to face and non-face to face on the date of this visit.This includes time preparing to see the patient (eg, review of tests, notes), obtaining and/or reviewing additional history from an independent historian and/or outside medical records, documenting clinical information in the electronic health record, independently interpreting results and/or communicating results to the patient/family/caregiver, or care coordinator.    Signed by:  Fletcher Mccray MD

## 2023-04-04 ENCOUNTER — PATIENT MESSAGE (OUTPATIENT)
Dept: PRIMARY CARE CLINIC | Facility: CLINIC | Age: 53
End: 2023-04-04
Payer: COMMERCIAL

## 2023-04-06 ENCOUNTER — PATIENT MESSAGE (OUTPATIENT)
Dept: PRIMARY CARE CLINIC | Facility: CLINIC | Age: 53
End: 2023-04-06
Payer: COMMERCIAL

## 2023-04-06 ENCOUNTER — NURSE TRIAGE (OUTPATIENT)
Dept: ADMINISTRATIVE | Facility: CLINIC | Age: 53
End: 2023-04-06
Payer: COMMERCIAL

## 2023-04-06 DIAGNOSIS — R06.6 INTRACTABLE HICCUPS: Primary | ICD-10-CM

## 2023-04-06 RX ORDER — CHLORPROMAZINE HYDROCHLORIDE 25 MG/1
25 TABLET, FILM COATED ORAL 3 TIMES DAILY
Qty: 15 TABLET | Refills: 0 | Status: SHIPPED | OUTPATIENT
Start: 2023-04-06 | End: 2023-05-19

## 2023-04-06 NOTE — TELEPHONE ENCOUNTER
Pt advised me that he is having a allergic  reaction (hiccups) to the decadron.Pt stopped taking the decadron. Pt want to know if you can call him something in for the hiccups

## 2023-04-06 NOTE — PROGRESS NOTES
Patient tried Decadron.  Restarted his hiccups.    Will just need to avoid all p.o. steroids in the future.      Prescription for Thorazine 25 mg t.i.d. for 3-5 days sent to pharmacy.

## 2023-04-06 NOTE — TELEPHONE ENCOUNTER
Patient's wife states patient c/o Hiccups since Tuesday, 04/04/23. Patient states he visited his PCP, Dr. Fletcher Mccray on 04/03/23 and was prescribed Dexamethasone 8 mg. Patient's wife states patient's history of developing hiccups when using a steroid. Patient's wife states that Dr. Mccray provided patient with his personal cell phone number to contact him if patient develops hiccups. Patient states onset of hiccups on 04/04/23 that continue at this time. Patient is currently located in the [facility].     Care Advice given to visit an Urgent Care Center for evaluation/treatment. Patient declined care advice at this time and call abruptly ended.      Reason for Disposition   [1] Hiccups present > 3 hours AND [2] severe AND [3] no improvement using hiccup treatment per protocol    Additional Information   Negative: Chest pain   Negative: Difficulty breathing   Negative: [1] Abdomen pain is main symptom AND [2] male   Negative: [1] Abdomen pain is main symptom AND [2] adult female   Negative: Vomiting   Negative: Patient sounds very sick or weak to the triager    Protocols used: [id number]

## 2023-05-19 ENCOUNTER — HOSPITAL ENCOUNTER (OUTPATIENT)
Dept: RADIOLOGY | Facility: HOSPITAL | Age: 53
Discharge: HOME | End: 2023-05-19
Attending: FAMILY MEDICINE
Payer: COMMERCIAL

## 2023-05-19 ENCOUNTER — OFFICE VISIT (OUTPATIENT)
Dept: PRIMARY CARE CLINIC | Facility: CLINIC | Age: 53
End: 2023-05-19
Payer: COMMERCIAL

## 2023-05-19 VITALS
HEART RATE: 74 BPM | HEIGHT: 73 IN | BODY MASS INDEX: 27.55 KG/M2 | SYSTOLIC BLOOD PRESSURE: 112 MMHG | TEMPERATURE: 98 F | DIASTOLIC BLOOD PRESSURE: 82 MMHG | WEIGHT: 207.88 LBS

## 2023-05-19 DIAGNOSIS — J06.9 URI WITH COUGH AND CONGESTION: ICD-10-CM

## 2023-05-19 DIAGNOSIS — J32.9 RECURRENT SINUSITIS: Primary | ICD-10-CM

## 2023-05-19 PROCEDURE — 99215 PR OFFICE/OUTPT VISIT, EST, LEVL V, 40-54 MIN: ICD-10-PCS | Mod: 25,S$GLB,, | Performed by: FAMILY MEDICINE

## 2023-05-19 PROCEDURE — 3008F PR BODY MASS INDEX (BMI) DOCUMENTED: ICD-10-PCS | Mod: CPTII,S$GLB,, | Performed by: FAMILY MEDICINE

## 2023-05-19 PROCEDURE — 96372 THER/PROPH/DIAG INJ SC/IM: CPT | Mod: S$GLB,,, | Performed by: FAMILY MEDICINE

## 2023-05-19 PROCEDURE — 71046 X-RAY EXAM CHEST 2 VIEWS: CPT | Mod: TC,PN

## 2023-05-19 PROCEDURE — 99999 PR PBB SHADOW E&M-EST. PATIENT-LVL IV: ICD-10-PCS | Mod: PBBFAC,,, | Performed by: FAMILY MEDICINE

## 2023-05-19 PROCEDURE — 3079F DIAST BP 80-89 MM HG: CPT | Mod: CPTII,S$GLB,, | Performed by: FAMILY MEDICINE

## 2023-05-19 PROCEDURE — 3044F PR MOST RECENT HEMOGLOBIN A1C LEVEL <7.0%: ICD-10-PCS | Mod: CPTII,S$GLB,, | Performed by: FAMILY MEDICINE

## 2023-05-19 PROCEDURE — 1159F MED LIST DOCD IN RCRD: CPT | Mod: CPTII,S$GLB,, | Performed by: FAMILY MEDICINE

## 2023-05-19 PROCEDURE — 71046 X-RAY EXAM CHEST 2 VIEWS: CPT | Mod: 26,,, | Performed by: RADIOLOGY

## 2023-05-19 PROCEDURE — 3008F BODY MASS INDEX DOCD: CPT | Mod: CPTII,S$GLB,, | Performed by: FAMILY MEDICINE

## 2023-05-19 PROCEDURE — 96372 PR INJECTION,THERAP/PROPH/DIAG2ST, IM OR SUBCUT: ICD-10-PCS | Mod: S$GLB,,, | Performed by: FAMILY MEDICINE

## 2023-05-19 PROCEDURE — 99215 OFFICE O/P EST HI 40 MIN: CPT | Mod: 25,S$GLB,, | Performed by: FAMILY MEDICINE

## 2023-05-19 PROCEDURE — 99999 PR PBB SHADOW E&M-EST. PATIENT-LVL IV: CPT | Mod: PBBFAC,,, | Performed by: FAMILY MEDICINE

## 2023-05-19 PROCEDURE — 3074F PR MOST RECENT SYSTOLIC BLOOD PRESSURE < 130 MM HG: ICD-10-PCS | Mod: CPTII,S$GLB,, | Performed by: FAMILY MEDICINE

## 2023-05-19 PROCEDURE — 1159F PR MEDICATION LIST DOCUMENTED IN MEDICAL RECORD: ICD-10-PCS | Mod: CPTII,S$GLB,, | Performed by: FAMILY MEDICINE

## 2023-05-19 PROCEDURE — 71046 XR CHEST PA AND LATERAL: ICD-10-PCS | Mod: 26,,, | Performed by: RADIOLOGY

## 2023-05-19 PROCEDURE — 3079F PR MOST RECENT DIASTOLIC BLOOD PRESSURE 80-89 MM HG: ICD-10-PCS | Mod: CPTII,S$GLB,, | Performed by: FAMILY MEDICINE

## 2023-05-19 PROCEDURE — 3074F SYST BP LT 130 MM HG: CPT | Mod: CPTII,S$GLB,, | Performed by: FAMILY MEDICINE

## 2023-05-19 PROCEDURE — 3044F HG A1C LEVEL LT 7.0%: CPT | Mod: CPTII,S$GLB,, | Performed by: FAMILY MEDICINE

## 2023-05-19 RX ORDER — BETAMETHASONE SODIUM PHOSPHATE AND BETAMETHASONE ACETATE 3; 3 MG/ML; MG/ML
9 INJECTION, SUSPENSION INTRA-ARTICULAR; INTRALESIONAL; INTRAMUSCULAR; SOFT TISSUE
Status: COMPLETED | OUTPATIENT
Start: 2023-05-19 | End: 2023-05-19

## 2023-05-19 RX ORDER — DICLOFENAC SODIUM 50 MG/1
50 TABLET, DELAYED RELEASE ORAL 3 TIMES DAILY PRN
Qty: 30 TABLET | Refills: 0 | Status: SHIPPED | OUTPATIENT
Start: 2023-05-19 | End: 2023-05-29

## 2023-05-19 RX ORDER — PROMETHAZINE HYDROCHLORIDE AND DEXTROMETHORPHAN HYDROBROMIDE 6.25; 15 MG/5ML; MG/5ML
5 SYRUP ORAL EVERY 6 HOURS PRN
Qty: 120 ML | Refills: 0 | Status: SHIPPED | OUTPATIENT
Start: 2023-05-19 | End: 2023-05-29

## 2023-05-19 RX ORDER — CYCLOBENZAPRINE HCL 10 MG
10 TABLET ORAL NIGHTLY PRN
Qty: 30 TABLET | Refills: 0 | Status: SHIPPED | OUTPATIENT
Start: 2023-05-19

## 2023-05-19 RX ORDER — BETAMETHASONE SODIUM PHOSPHATE AND BETAMETHASONE ACETATE 3; 3 MG/ML; MG/ML
9 INJECTION, SUSPENSION INTRA-ARTICULAR; INTRALESIONAL; INTRAMUSCULAR; SOFT TISSUE
Status: DISCONTINUED | OUTPATIENT
Start: 2023-05-19 | End: 2023-05-19

## 2023-05-19 RX ORDER — DOXYCYCLINE HYCLATE 100 MG
100 TABLET ORAL 2 TIMES DAILY
Qty: 20 TABLET | Refills: 0 | Status: SHIPPED | OUTPATIENT
Start: 2023-05-19 | End: 2023-05-29

## 2023-05-19 RX ADMIN — BETAMETHASONE SODIUM PHOSPHATE AND BETAMETHASONE ACETATE 9 MG: 3; 3 INJECTION, SUSPENSION INTRA-ARTICULAR; INTRALESIONAL; INTRAMUSCULAR; SOFT TISSUE at 08:05

## 2023-05-19 NOTE — PROGRESS NOTES
Chest x-ray looks good!     No signs of pneumonia nor anything else worrisome.      Try my medications.  Have fun on your cruise!

## 2023-05-19 NOTE — PROGRESS NOTES
"    [facility]       [address] [place] [address]      Phone: [phone]      Fax: [phone]    Fletcher Mccray MD                Office Visit  05/19/2023        Subjective      HPI:  Eduardo Sanders is a 52 y.o. male presents today in clinic for "Sinus Problem  ."     52-year-old gentleman presents today complaining of continued sinus issues.      Symptoms have been going on since at least March, 2-3 months ago.  After last visit, sinuses improved, but never completely went away.  Over the last week or so, symptoms have gotten worse.  Wife has similar symptoms.  Lots of runny nose, postnasal drip.  His blowing out thick, hard, yellow mucus from his nostrils, sinuses.  Has a cough, productive of similar sputum.  When he coughs, he gets mild, discomfort under his left breast.  Sinus pressure, but not really any pains.  Has been using OTC nasal congestion medicine.  OTC decongestant.  Afrin.  Claritin.  NyQuil.    In the past, prednisone tablets have been causing extreme hiccups.  Last visit, we tried Decadron, but within a couple of days that also caused hiccups.  The Thorazine did not help.  Years ago, he had a PCP give him a muscle relaxer and just one tablet help the hiccups.    PMH: Anxiety/anger issues.  Colon polyps.  Diverticulitis.    PSH: Gallbladder.  Hand surgery.    F MH: Multiple cancers.  Lymphoma.  Breast cancer.  Cervical cancer.  Skin cancer.    Allergies:  P.o. Prednisone, Decadron causes extreme hiccups.  Injections are okay.  Social:  Works as a project supervisor in [employer]  Currently working on the [employer].    T:  Approximately 1/4 pack per day times 20+ years   A:  Occasionally  D: Denies    Exercise:  No regular exercise program      The following were updated and reviewed by myself in the chart: medications, past medical history, past surgical history, family history, social history, and allergies. "     Medications:  Current Outpatient Medications on File Prior to Visit   Medication Sig Dispense Refill    venlafaxine (EFFEXOR-XR) 75 MG 24 hr capsule Take 1 capsule (75 mg total) by mouth once daily. 90 capsule 3    [DISCONTINUED] chlorproMAZINE (THORAZINE) 25 MG tablet Take 1 tablet (25 mg total) by mouth 3 (three) times daily. for 5 days 15 tablet 0     No current facility-administered medications on file prior to visit.        PMHx:  Past Medical History:   Diagnosis Date    Anxiety       There are no problems to display for this patient.       PSHx:  Past Surgical History:   Procedure Laterality Date    CHOLECYSTECTOMY  2014        FHx:  Family History   Problem Relation Age of Onset    Arthritis Mother     Cancer Mother     Hypertension Mother     Arthritis Father     Heart disease Father     Heart disease Paternal Grandmother     Cancer Maternal Uncle     Cancer Maternal Aunt     Cancer Maternal Aunt         Social:  Social History     Socioeconomic History    Marital status:    Tobacco Use    Smoking status: Every Day     Packs/day: 0.25     Years: 25.00     Pack years: 6.25     Types: Cigarettes     Start date: 1/1/1998    Smokeless tobacco: Never   Substance and Sexual Activity    Alcohol use: Yes     Alcohol/week: 1.0 standard drink     Types: 1 Shots of liquor per week     Comment: on occasion    Drug use: Never    Sexual activity: Yes     Partners: Female     Birth control/protection: See Surgical Hx     Comment: Spouse had Hysterectomy        Allergies:  Review of patient's allergies indicates:   Allergen Reactions    Prednisone Other (See Comments)     Causes severe hiccups per pt        ROS:  Review of Systems   Constitutional:  Negative for activity change, appetite change, chills, fever and unexpected weight change.   HENT:  Positive for postnasal drip, rhinorrhea and sinus pressure. Negative for congestion, hearing loss, sinus pain, sore throat and trouble swallowing.    Eyes:  Negative  "for discharge and visual disturbance.   Respiratory:  Positive for cough. Negative for chest tightness, shortness of breath and wheezing.    Cardiovascular:  Negative for chest pain and palpitations.   Gastrointestinal:  Negative for abdominal pain, blood in stool, constipation, diarrhea, nausea and vomiting.   Endocrine: Negative for polydipsia and polyuria.   Genitourinary:  Negative for difficulty urinating, hematuria and urgency.   Musculoskeletal:  Negative for arthralgias, joint swelling, myalgias and neck pain.   Skin:  Negative for color change and rash.   Neurological:  Positive for headaches. Negative for weakness.   Psychiatric/Behavioral:  Negative for confusion and dysphoric mood.    All other systems reviewed and are negative.       Objective      /82   Pulse 74   Temp 97.7 °F (36.5 °C)   Ht 6' 1" (1.854 m)   Wt 94.3 kg (207 lb 14.3 oz)   BMI 27.43 kg/m²   Ht Readings from Last 3 Encounters:   05/19/23 6' 1" (1.854 m)   04/03/23 6' 1" (1.854 m)   01/31/23 6' 1" (1.854 m)     Wt Readings from Last 3 Encounters:   05/19/23 94.3 kg (207 lb 14.3 oz)   04/03/23 95.3 kg (210 lb 1.6 oz)   01/31/23 96.7 kg (213 lb 3 oz)       PHYSICAL EXAM:  Physical Exam  Vitals and nursing note reviewed.   Constitutional:       General: He is not in acute distress.     Appearance: Normal appearance.   HENT:      Head: Normocephalic and atraumatic.      Right Ear: Tympanic membrane, ear canal and external ear normal.      Left Ear: Tympanic membrane, ear canal and external ear normal.      Nose: Congestion present. No rhinorrhea.      Mouth/Throat:      Mouth: Mucous membranes are moist.      Pharynx: Oropharynx is clear. No oropharyngeal exudate or posterior oropharyngeal erythema.   Eyes:      Extraocular Movements: Extraocular movements intact.      Conjunctiva/sclera: Conjunctivae normal.      Pupils: Pupils are equal, round, and reactive to light.   Cardiovascular:      Rate and Rhythm: Normal rate and regular " rhythm.   Pulmonary:      Effort: Pulmonary effort is normal.      Breath sounds: No wheezing, rhonchi or rales.   Musculoskeletal:         General: Normal range of motion.      Cervical back: Normal range of motion.   Lymphadenopathy:      Cervical: No cervical adenopathy.   Skin:     General: Skin is warm and dry.   Neurological:      General: No focal deficit present.      Mental Status: He is alert.            LABS / IMAGING:  Recent Results (from the past 4368 hour(s))   CBC Auto Differential    Collection Time: 01/31/23  9:34 AM   Result Value Ref Range    WBC 6.07 3.90 - 12.70 K/uL    RBC 5.37 4.60 - 6.20 M/uL    Hemoglobin 15.2 14.0 - 18.0 g/dL    Hematocrit 46.7 40.0 - 54.0 %    MCV 87 82 - 98 fL    MCH 28.3 27.0 - 31.0 pg    MCHC 32.5 32.0 - 36.0 g/dL    RDW 12.5 11.5 - 14.5 %    Platelets 333 150 - 450 K/uL    MPV 11.0 9.2 - 12.9 fL    Immature Granulocytes 0.3 0.0 - 0.5 %    Gran # (ANC) 3.2 1.8 - 7.7 K/uL    Immature Grans (Abs) 0.02 0.00 - 0.04 K/uL    Lymph # 2.1 1.0 - 4.8 K/uL    Mono # 0.7 0.3 - 1.0 K/uL    Eos # 0.1 0.0 - 0.5 K/uL    Baso # 0.09 0.00 - 0.20 K/uL    nRBC 0 0 /100 WBC    Gran % 52.5 38.0 - 73.0 %    Lymph % 33.8 18.0 - 48.0 %    Mono % 10.9 4.0 - 15.0 %    Eosinophil % 1.0 0.0 - 8.0 %    Basophil % 1.5 0.0 - 1.9 %    Differential Method Automated    Comprehensive Metabolic Panel    Collection Time: 01/31/23  9:34 AM   Result Value Ref Range    Sodium 139 136 - 145 mmol/L    Potassium 4.7 3.5 - 5.1 mmol/L    Chloride 106 95 - 110 mmol/L    CO2 25 23 - 29 mmol/L    Glucose 103 70 - 110 mg/dL    BUN 7 6 - 20 mg/dL    Creatinine 0.9 0.5 - 1.4 mg/dL    Calcium 9.8 8.7 - 10.5 mg/dL    Total Protein 7.9 6.0 - 8.4 g/dL    Albumin 4.4 3.5 - 5.2 g/dL    Total Bilirubin 0.5 0.1 - 1.0 mg/dL    Alkaline Phosphatase 80 55 - 135 U/L    AST 23 10 - 40 U/L    ALT 41 10 - 44 U/L    Anion Gap 8 8 - 16 mmol/L    eGFR >60.0 >60 mL/min/1.73 m^2   TSH    Collection Time: 01/31/23  9:34 AM   Result Value  Ref Range    TSH 1.641 0.400 - 4.000 uIU/mL   Lipid Panel    Collection Time: 01/31/23  9:34 AM   Result Value Ref Range    Cholesterol 274 (H) 120 - 199 mg/dL    Triglycerides 347 (H) 30 - 150 mg/dL    HDL 40 40 - 75 mg/dL    LDL Cholesterol 164.6 (H) 63.0 - 159.0 mg/dL    HDL/Cholesterol Ratio 14.6 (L) 20.0 - 50.0 %    Total Cholesterol/HDL Ratio 6.9 (H) 2.0 - 5.0    Non-HDL Cholesterol 234 mg/dL   PSA, Screening    Collection Time: 01/31/23  9:34 AM   Result Value Ref Range    PSA, Screen 0.87 0.00 - 4.00 ng/mL   Hemoglobin A1C    Collection Time: 01/31/23  9:34 AM   Result Value Ref Range    Hemoglobin A1C 5.7 (H) 4.0 - 5.6 %    Estimated Avg Glucose 117 68 - 131 mg/dL   HIV 1/2 Ag/Ab (4th Gen)    Collection Time: 01/31/23  9:34 AM   Result Value Ref Range    HIV 1/2 Ag/Ab Non-reactive Non-reactive   Hepatitis C Antibody    Collection Time: 01/31/23  9:34 AM   Result Value Ref Range    Hepatitis C Ab Non-reactive Non-reactive   Testosterone    Collection Time: 01/31/23  9:34 AM   Result Value Ref Range    Testosterone, Total 446 304 - 1227 ng/dL         Assessment    1. Recurrent sinusitis    2. URI with cough and congestion          Plan    Eduardo was seen today for sinus problem.    Diagnoses and all orders for this visit:    Recurrent sinusitis  -     Discontinue: betamethasone acetate-betamethasone sodium phosphate injection 9 mg  -     doxycycline (VIBRA-TABS) 100 MG tablet; Take 1 tablet (100 mg total) by mouth 2 (two) times daily. for 10 days  -     promethazine-dextromethorphan (PROMETHAZINE-DM) 6.25-15 mg/5 mL Syrp; Take 5 mLs by mouth every 6 (six) hours as needed (cough).  -     diclofenac (VOLTAREN) 50 MG EC tablet; Take 1 tablet (50 mg total) by mouth 3 (three) times daily as needed (pain).  -     cyclobenzaprine (FLEXERIL) 10 MG tablet; Take 1 tablet (10 mg total) by mouth nightly as needed for Muscle spasms (or hiccups).  -     betamethasone acetate-betamethasone sodium phosphate injection 9  mg    URI with cough and congestion  -     X-Ray Chest PA And Lateral; Future  -     Discontinue: betamethasone acetate-betamethasone sodium phosphate injection 9 mg  -     doxycycline (VIBRA-TABS) 100 MG tablet; Take 1 tablet (100 mg total) by mouth 2 (two) times daily. for 10 days  -     promethazine-dextromethorphan (PROMETHAZINE-DM) 6.25-15 mg/5 mL Syrp; Take 5 mLs by mouth every 6 (six) hours as needed (cough).  -     diclofenac (VOLTAREN) 50 MG EC tablet; Take 1 tablet (50 mg total) by mouth 3 (three) times daily as needed (pain).  -     cyclobenzaprine (FLEXERIL) 10 MG tablet; Take 1 tablet (10 mg total) by mouth nightly as needed for Muscle spasms (or hiccups).  -     betamethasone acetate-betamethasone sodium phosphate injection 9 mg      Physically looks okay, just extremely congested.  Will get chest x-ray today because of the pain with coughing.    Will try doxycycline instead of Augmentin.  Lots of fluids.  Nasal saline spray.  Neti pot/nasal lavage.  Only use Afrin for 2-3 days max.    If symptoms return, or never completely clear, next step is to see ENT.    FOLLOW-UP:  Follow up if symptoms worsen or fail to improve.    I spent a total of 45 minutes face to face and non-face to face on the date of this visit.This includes time preparing to see the patient (eg, review of tests, notes), obtaining and/or reviewing additional history from an independent historian and/or outside medical records, documenting clinical information in the electronic health record, independently interpreting results and/or communicating results to the patient/family/caregiver, or care coordinator.    Signed by:  Fletcher Mccray MD

## 2023-05-19 NOTE — PATIENT INSTRUCTIONS
Physically, everything looks pretty good today.      I do not like the discomfort when you cough.  Let us get a chest x-ray just to make sure everything looks okay on the inside.  We will contact you with those results as soon as they are available.      Steroid injection today.  I am sending a muscle relaxer to the pharmacy, just in case the shot restarts your hiccups.      Instead of steroid tablets, let us try using a nonsteroidal anti-inflammatory medication.  Prescription for Voltaren (diclofenac) sent to the pharmacy.  Try taking that 2-3 times per day for the next couple of days.      Will also treat with doxycycline since the penicillins were not completely effective.      Stay away from OTC decongestants.  I think they are drying you up too much and that is what is perpetuating this.      Instead, try using OTC nasal saline spray several times throughout the day.  This will keep the nasal passages and sinuses flushed out.  You can also use a Neti pot or other nasal lavage kit.  Just be sure to use sterile or distilled water only.  No tap water.    If you get no significant improvement, or if it flares up again when you get back from your trip, my next step would be to have you see ENT.

## 2023-05-19 NOTE — PROGRESS NOTES
After obtaining consent, per orders from Dr Mccray, celestone 9mg injection given by myself. Pt instructed to wait 15 min afterwards and to report any adverse reaction/feelings immediately.    Pt declined to wait.

## 2023-06-11 DIAGNOSIS — J06.9 URI WITH COUGH AND CONGESTION: ICD-10-CM

## 2023-06-11 DIAGNOSIS — J32.9 RECURRENT SINUSITIS: ICD-10-CM

## 2023-06-11 NOTE — TELEPHONE ENCOUNTER
No care due was identified.  [facility] Embedded Care Due Messages. Reference number: [id number].   6/11/2023 11:30:51 AM CDT

## 2023-06-13 RX ORDER — CYCLOBENZAPRINE HCL 10 MG
TABLET ORAL
Qty: 30 TABLET | Refills: 0 | OUTPATIENT
Start: 2023-06-13

## 2023-06-13 NOTE — TELEPHONE ENCOUNTER
Refill Routing Note   Medication(s) are not appropriate for processing by [facility] for the following reason(s):      Medication outside of protocol    [facility] action(s):  Route None identified          Appointments  past 12m or future 3m with PCP    Date Provider   Last Visit   5/19/2023 Fletcher Mccray MD   Next Visit   7/31/2023 Fletcher Mccray MD   ED visits in past 90 days: 0        Note composed:9:11 AM 06/13/2023

## 2024-02-12 DIAGNOSIS — F41.9 ANXIETY: ICD-10-CM

## 2024-02-12 NOTE — TELEPHONE ENCOUNTER
Care Due:                  Date            Visit Type   Department     Provider  --------------------------------------------------------------------------------                                MYCHART                              FOLLOWUP/OF  [facility]  Last Visit: 05-      FICE VISIT   CONRAD Mccray  Next Visit: None Scheduled  None         None Found                                                            Last  Test          Frequency    Reason                     Performed    Due Date  --------------------------------------------------------------------------------    Cr..........  12 months..  venlafaxine..............  02- 01-    [facility] Embedded Care Due Messages. Reference number: [id number].   2/12/2024 7:35:37 AM CST

## 2024-02-13 RX ORDER — VENLAFAXINE HYDROCHLORIDE 75 MG/1
75 CAPSULE, EXTENDED RELEASE ORAL DAILY
Qty: 90 CAPSULE | Refills: 3 | Status: SHIPPED | OUTPATIENT
Start: 2024-02-13 | End: 2025-02-12